# Patient Record
(demographics unavailable — no encounter records)

---

## 2024-12-11 NOTE — REVIEW OF SYSTEMS
[Fever] : no fever [Chills] : no chills [Chest Pain] : no chest pain [Palpitations] : no palpitations [Lower Ext Edema] : no lower extremity edema [Shortness Of Breath] : no shortness of breath [Wheezing] : no wheezing [Cough] : no cough [Dyspnea on Exertion] : not dyspnea on exertion [Abdominal Pain] : no abdominal pain [Melena] : no melena [Dysuria] : no dysuria [Hematuria] : no hematuria [Headache] : no headache [Dizziness] : no dizziness

## 2024-12-11 NOTE — HISTORY OF PRESENT ILLNESS
[FreeTextEntry1] : Follow up [de-identified] : 51 y/o M with a PMH of  DM, HLD, CAD/STEMI s/p PCI PLAD on 1/6/2015 currently follows Dr. Behuria here for a follow-up visit. Patient states he feels well and has no complaints.

## 2024-12-11 NOTE — ASSESSMENT
[FreeTextEntry1] : 49 y/o M with a PMH of CAD/STEMI s/p PCI PLAD on 1/6/2015 currently follows Dr. Behuria here for a follow-up visit.   #CAD/STEMI s/p PCI PLAD #HLD - BP well controlled off medications (130/84 today) - lipid panel: LDL 92 in June 2024 - c/w aspirin, Jardiance and atorvastatin 80mg and ezetimide - followed by cardiology; stopped Metoprolol because of intolerance - did stress test and echo, unremarkable  #DM2 - A1C 6.8 in June 2024 - c/w jardiance  - follows with podiatry   #HCM - f/u in 6 months.and prn - Labs ordered. -declined flu vaccine -declined colonoscopy (will send referral as patient said he doesn't want colo but will think about it) and refuses FIT test rto in 6 months and prn

## 2024-12-11 NOTE — PHYSICAL EXAM
[No Acute Distress] : no acute distress [Well-Appearing] : well-appearing [No Respiratory Distress] : no respiratory distress  [No Accessory Muscle Use] : no accessory muscle use [Clear to Auscultation] : lungs were clear to auscultation bilaterally [Normal Rate] : normal rate  [Regular Rhythm] : with a regular rhythm [No Edema] : there was no peripheral edema [Soft] : abdomen soft [Non Tender] : non-tender [No CVA Tenderness] : no CVA  tenderness [No Spinal Tenderness] : no spinal tenderness [Normal Affect] : the affect was normal

## 2025-02-18 NOTE — REVIEW OF SYSTEMS
[SOB] : no shortness of breath [Dyspnea on exertion] : not dyspnea during exertion [Chest Discomfort] : no chest discomfort [Palpitations] : no palpitations [Abdominal Pain] : no abdominal pain

## 2025-02-18 NOTE — PHYSICAL EXAM
[Well Developed] : well developed [Normal S1, S2] : normal S1, S2 [No Murmur] : no murmur [Clear Lung Fields] : clear lung fields [No Edema] : no edema

## 2025-02-18 NOTE — ASSESSMENT
[FreeTextEntry1] : #CAD, hx of STEMI s/p PCI to pLAD #HLD - NST negative 1/2024 - TTE 1/2024 with normal LVSF - BP well controlled - C/w ASA, statin, Ezetimibe  - Due for routine labs, has referral from PMD  #DM - Last A1c 6.8% - C/w Jardiance - Repeat labs pending

## 2025-02-18 NOTE — HISTORY OF PRESENT ILLNESS
[FreeTextEntry1] : 50 year old male with a PMH of CAD/STEMI s/p PCI pLAD in 2015, DM presents for follow up.  Has been feeling well, denies any anginal symptoms. Currently not exercising due to weather, climbs up two flights of stairs in his house without any symptoms. Compliant with medications. Does not smoke, drinks glass of wine daily.  Last TTE January 2024 - normal LVSF.  NST January 2024 - negative for ischemia.

## 2025-06-11 NOTE — PHYSICAL EXAM
[No Acute Distress] : no acute distress [Well-Appearing] : well-appearing [No Respiratory Distress] : no respiratory distress  [No Accessory Muscle Use] : no accessory muscle use [Clear to Auscultation] : lungs were clear to auscultation bilaterally [Normal Rate] : normal rate  [Regular Rhythm] : with a regular rhythm [No Edema] : there was no peripheral edema [Soft] : abdomen soft [No CVA Tenderness] : no CVA  tenderness [Non Tender] : non-tender [No Spinal Tenderness] : no spinal tenderness [Normal Affect] : the affect was normal

## 2025-06-11 NOTE — REVIEW OF SYSTEMS
[Fever] : no fever [Chills] : no chills [Chest Pain] : no chest pain [Palpitations] : no palpitations [Shortness Of Breath] : no shortness of breath [Lower Ext Edema] : no lower extremity edema [Cough] : no cough [Wheezing] : no wheezing [Dyspnea on Exertion] : not dyspnea on exertion [Abdominal Pain] : no abdominal pain [Dysuria] : no dysuria [Melena] : no melena [Hematuria] : no hematuria [Headache] : no headache [Dizziness] : no dizziness

## 2025-06-11 NOTE — HISTORY OF PRESENT ILLNESS
[de-identified] : 50 y/o M with a PMH of  DM, HLD, CAD/STEMI s/p PCI PLAD on 1/6/2015 currently follows Dr. Behuria here for a follow-up visit. 
None known

## 2025-06-11 NOTE — ASSESSMENT
[FreeTextEntry1] : 50 y/o M with a PMH of CAD/STEMI s/p PCI PLAD on 1/6/2015 currently follows Dr. Behuria here for a follow-up visit.   #CAD/STEMI s/p PCI PLAD #HLD - BP well controlled off medications (130/84 today) - lipid panel:  in march 2025 - c/w aspirin, Jardiance and atorvastatin 80mg and ezetimibe - followed by cardiology; stopped Metoprolol because of intolerance - did stress test and echo, unremarkable  #DM2 - A1C 8.0<<6.8 in march 2025 - inc Jardiance to 25 mg  - endo referral  - consumes brown rice for lunch, wholewheat bread for breakfast, 1 chocolate bar once a month  - counselled regarding diet and exercise  - follows with podiatry  - referral given for ophto   #HCM - GI appt in July for colono, pt is unsure whether or not will pursue colono, encourage to follow through with the appt  -rto in 6 months and prn